# Patient Record
Sex: FEMALE | Race: WHITE | NOT HISPANIC OR LATINO | Employment: FULL TIME | URBAN - METROPOLITAN AREA
[De-identification: names, ages, dates, MRNs, and addresses within clinical notes are randomized per-mention and may not be internally consistent; named-entity substitution may affect disease eponyms.]

---

## 2017-07-11 ENCOUNTER — HOSPITAL ENCOUNTER (OUTPATIENT)
Dept: OTHER | Facility: HOSPITAL | Age: 32
Discharge: HOME | End: 2017-07-11
Attending: PHYSICIAN ASSISTANT | Admitting: PHYSICIAN ASSISTANT

## 2017-07-11 LAB
ALBUMIN SERPL-MCNC: 3.8 G/DL (ref 3.5–4.8)
ALBUMIN/GLOB SERPL: 0.9 {RATIO} (ref 1–1.7)
ALP SERPL-CCNC: 58 IU/L (ref 32–91)
ALT SERPL-CCNC: 35 IU/L (ref 14–54)
AMPHETAMINES UR QL SCN: NEGATIVE
ANION GAP SERPL CALC-SCNC: 14 MMOL/L (ref 10–20)
AST SERPL-CCNC: 28 IU/L (ref 15–41)
BARBITURATES UR QL SCN: NEGATIVE
BASOPHILS # BLD AUTO: 0.1 10*3/UL (ref 0–0.2)
BASOPHILS NFR BLD AUTO: 1 % (ref 0–2)
BENZODIAZ UR QL SCN: NEGATIVE
BILIRUB SERPL-MCNC: 0.6 MG/DL (ref 0.3–1.2)
BUN SERPL-MCNC: 10 MG/DL (ref 8–20)
BUN/CREAT SERPL: 12.5 (ref 5.4–26.2)
BZE UR QL SCN: NEGATIVE
CALCIUM SERPL-MCNC: 9.7 MG/DL (ref 8.9–10.3)
CHLORIDE SERPL-SCNC: 103 MMOL/L (ref 101–111)
CHOLEST SERPL-MCNC: 239 MG/DL
CHOLEST/HDLC SERPL: 5.2 {RATIO}
CONV CO2: 27 MMOL/L (ref 22–32)
CONV LDL CHOLESTEROL DIRECT: 177 MG/DL (ref 0–100)
CONV TOTAL PROTEIN: 8.2 G/DL (ref 6.1–7.9)
CREAT 24H UR-MCNC: NORMAL MG/DL
CREAT UR-MCNC: 0.8 MG/DL (ref 0.4–1)
DIFFERENTIAL METHOD BLD: (no result)
EOSINOPHIL # BLD AUTO: 0.1 10*3/UL (ref 0–0.3)
EOSINOPHIL # BLD AUTO: 1 % (ref 0–3)
ERYTHROCYTE [DISTWIDTH] IN BLOOD BY AUTOMATED COUNT: 13.3 % (ref 11.5–14.5)
GLOBULIN UR ELPH-MCNC: 4.4 G/DL (ref 2.5–3.8)
GLUCOSE SERPL-MCNC: 89 MG/DL (ref 65–99)
HCT VFR BLD AUTO: 40.1 % (ref 35–49)
HDLC SERPL-MCNC: 46 MG/DL
HGB BLD-MCNC: 13.6 G/DL (ref 12–15)
LDLC/HDLC SERPL: 3.8 {RATIO}
LIPID INTERPRETATION: ABNORMAL
LYMPHOCYTES # BLD AUTO: 1.4 10*3/UL (ref 0.8–4.8)
LYMPHOCYTES NFR BLD AUTO: 15 % (ref 18–42)
MCH RBC QN AUTO: 29.8 PG (ref 26–32)
MCHC RBC AUTO-ENTMCNC: 33.8 G/DL (ref 32–36)
MCV RBC AUTO: 88 FL (ref 80–94)
METHADONE UR QL SCN: NEGATIVE
MONOCYTES # BLD AUTO: 0.7 10*3/UL (ref 0.1–1.3)
MONOCYTES NFR BLD AUTO: 7 % (ref 2–11)
NEUTROPHILS # BLD AUTO: 7.1 10*3/UL (ref 2.3–8.6)
NEUTROPHILS NFR BLD AUTO: 76 % (ref 50–75)
NRBC BLD AUTO-RTO: 0 /100{WBCS}
NRBC/RBC NFR BLD MANUAL: 0 10*3/UL
OPIATES TESTED UR SCN: NEGATIVE
PCP UR QL: NEGATIVE
PLATELET # BLD AUTO: 355 10*3/UL (ref 150–450)
PMV BLD AUTO: 9.4 FL (ref 7.4–10.4)
POTASSIUM SERPL-SCNC: 4 MMOL/L (ref 3.6–5.1)
RBC # BLD AUTO: 4.56 10*6/UL (ref 4–5.4)
SODIUM SERPL-SCNC: 140 MMOL/L (ref 136–144)
THC SERPLBLD CFM-MCNC: NEGATIVE NG/ML
TRIGL SERPL-MCNC: 145 MG/DL
TSH SERPL-ACNC: 1.61 UIU/ML (ref 0.34–5.6)
VLDLC SERPL CALC-MCNC: 16.5 MG/DL
WBC # BLD AUTO: 9.3 10*3/UL (ref 4.5–11.5)

## 2017-09-19 ENCOUNTER — HOSPITAL ENCOUNTER (OUTPATIENT)
Dept: PREOP | Facility: HOSPITAL | Age: 32
Setting detail: HOSPITAL OUTPATIENT SURGERY
Discharge: HOME | End: 2017-09-19
Attending: SURGERY | Admitting: SURGERY

## 2017-09-19 LAB
BACTERIA UREASE ISLT QL: NORMAL
Lab: NORMAL
MICRO REPORT STATUS: NORMAL
SPECIMEN SOURCE: NORMAL

## 2017-12-28 ENCOUNTER — CONVERSION ENCOUNTER (OUTPATIENT)
Dept: OTHER | Facility: HOSPITAL | Age: 32
End: 2017-12-28

## 2018-02-05 ENCOUNTER — HOSPITAL ENCOUNTER (OUTPATIENT)
Dept: OTHER | Facility: HOSPITAL | Age: 33
Discharge: HOME | End: 2018-02-05
Attending: SURGERY | Admitting: SURGERY

## 2018-02-05 LAB
ALBUMIN SERPL-MCNC: 3.8 G/DL (ref 3.5–4.8)
ALBUMIN/GLOB SERPL: 1.1 {RATIO} (ref 1–1.7)
ALP SERPL-CCNC: 58 IU/L (ref 32–91)
ALT SERPL-CCNC: 56 IU/L (ref 14–54)
ANION GAP SERPL CALC-SCNC: 12.8 MMOL/L (ref 10–20)
AST SERPL-CCNC: 40 IU/L (ref 15–41)
BASOPHILS # BLD AUTO: 0 10*3/UL (ref 0–0.2)
BASOPHILS NFR BLD AUTO: 1 % (ref 0–2)
BILIRUB SERPL-MCNC: 0.6 MG/DL (ref 0.3–1.2)
BUN SERPL-MCNC: 12 MG/DL (ref 8–20)
BUN/CREAT SERPL: 15 (ref 5.4–26.2)
CALCIUM SERPL-MCNC: 9.7 MG/DL (ref 8.9–10.3)
CHLORIDE SERPL-SCNC: 104 MMOL/L (ref 101–111)
CONV CO2: 24 MMOL/L (ref 22–32)
CONV TOTAL PROTEIN: 7.3 G/DL (ref 6.1–7.9)
CREAT UR-MCNC: 0.8 MG/DL (ref 0.4–1)
DIFFERENTIAL METHOD BLD: (no result)
EOSINOPHIL # BLD AUTO: 0.1 10*3/UL (ref 0–0.3)
EOSINOPHIL # BLD AUTO: 1 % (ref 0–3)
ERYTHROCYTE [DISTWIDTH] IN BLOOD BY AUTOMATED COUNT: 14.3 % (ref 11.5–14.5)
GLOBULIN UR ELPH-MCNC: 3.5 G/DL (ref 2.5–3.8)
GLUCOSE SERPL-MCNC: 83 MG/DL (ref 65–99)
HCT VFR BLD AUTO: 37.1 % (ref 35–49)
HGB BLD-MCNC: 12.6 G/DL (ref 12–15)
IRON SERPL-MCNC: 37 UG/DL (ref 28–170)
LYMPHOCYTES # BLD AUTO: 1.4 10*3/UL (ref 0.8–4.8)
LYMPHOCYTES NFR BLD AUTO: 16 % (ref 18–42)
MAGNESIUM SERPL-MCNC: 1.7 MG/DL (ref 1.8–2.5)
MCH RBC QN AUTO: 30 PG (ref 26–32)
MCHC RBC AUTO-ENTMCNC: 33.9 G/DL (ref 32–36)
MCV RBC AUTO: 88.4 FL (ref 80–94)
MONOCYTES # BLD AUTO: 0.6 10*3/UL (ref 0.1–1.3)
MONOCYTES NFR BLD AUTO: 7 % (ref 2–11)
NEUTROPHILS # BLD AUTO: 6.9 10*3/UL (ref 2.3–8.6)
NEUTROPHILS NFR BLD AUTO: 75 % (ref 50–75)
NRBC BLD AUTO-RTO: 0 /100{WBCS}
NRBC/RBC NFR BLD MANUAL: 0 10*3/UL
PLATELET # BLD AUTO: 290 10*3/UL (ref 150–450)
PMV BLD AUTO: 10 FL (ref 7.4–10.4)
POTASSIUM SERPL-SCNC: 3.8 MMOL/L (ref 3.6–5.1)
PREALB SERPL-MCNC: NORMAL MG/DL (ref 16–38)
RBC # BLD AUTO: 4.19 10*6/UL (ref 4–5.4)
SODIUM SERPL-SCNC: 137 MMOL/L (ref 136–144)
WBC # BLD AUTO: 9 10*3/UL (ref 4.5–11.5)

## 2018-04-09 ENCOUNTER — HOSPITAL ENCOUNTER (OUTPATIENT)
Dept: OTHER | Facility: HOSPITAL | Age: 33
Discharge: HOME | End: 2018-04-09
Attending: SURGERY | Admitting: SURGERY

## 2018-04-09 LAB
ALBUMIN SERPL-MCNC: 3.9 G/DL (ref 3.5–4.8)
ALBUMIN/GLOB SERPL: 1.1 {RATIO} (ref 1–1.7)
ALP SERPL-CCNC: 67 IU/L (ref 32–91)
ALT SERPL-CCNC: 30 IU/L (ref 14–54)
ANION GAP SERPL CALC-SCNC: 10 MMOL/L (ref 10–20)
AST SERPL-CCNC: 28 IU/L (ref 15–41)
BASOPHILS # BLD AUTO: 0.1 10*3/UL (ref 0–0.2)
BASOPHILS NFR BLD AUTO: 1 % (ref 0–2)
BILIRUB SERPL-MCNC: 0.5 MG/DL (ref 0.3–1.2)
BUN SERPL-MCNC: 8 MG/DL (ref 8–20)
BUN/CREAT SERPL: 10 (ref 5.4–26.2)
CALCIUM SERPL-MCNC: 10.1 MG/DL (ref 8.9–10.3)
CHLORIDE SERPL-SCNC: 106 MMOL/L (ref 101–111)
CONV CO2: 27 MMOL/L (ref 22–32)
CONV TOTAL PROTEIN: 7.3 G/DL (ref 6.1–7.9)
CREAT UR-MCNC: 0.8 MG/DL (ref 0.4–1)
DIFFERENTIAL METHOD BLD: (no result)
EOSINOPHIL # BLD AUTO: 0 10*3/UL (ref 0–0.3)
EOSINOPHIL # BLD AUTO: 1 % (ref 0–3)
ERYTHROCYTE [DISTWIDTH] IN BLOOD BY AUTOMATED COUNT: 13.1 % (ref 11.5–14.5)
GLOBULIN UR ELPH-MCNC: 3.4 G/DL (ref 2.5–3.8)
GLUCOSE SERPL-MCNC: 83 MG/DL (ref 65–99)
HCT VFR BLD AUTO: 39.7 % (ref 35–49)
HGB BLD-MCNC: 13.6 G/DL (ref 12–15)
LYMPHOCYTES # BLD AUTO: 1.2 10*3/UL (ref 0.8–4.8)
LYMPHOCYTES NFR BLD AUTO: 17 % (ref 18–42)
MCH RBC QN AUTO: 30.1 PG (ref 26–32)
MCHC RBC AUTO-ENTMCNC: 34.2 G/DL (ref 32–36)
MCV RBC AUTO: 88.2 FL (ref 80–94)
MONOCYTES # BLD AUTO: 0.4 10*3/UL (ref 0.1–1.3)
MONOCYTES NFR BLD AUTO: 6 % (ref 2–11)
NEUTROPHILS # BLD AUTO: 5.4 10*3/UL (ref 2.3–8.6)
NEUTROPHILS NFR BLD AUTO: 75 % (ref 50–75)
NRBC BLD AUTO-RTO: 0 /100{WBCS}
NRBC/RBC NFR BLD MANUAL: 0 10*3/UL
PLATELET # BLD AUTO: 320 10*3/UL (ref 150–450)
PMV BLD AUTO: 9.6 FL (ref 7.4–10.4)
POTASSIUM SERPL-SCNC: 4 MMOL/L (ref 3.6–5.1)
RBC # BLD AUTO: 4.5 10*6/UL (ref 4–5.4)
SODIUM SERPL-SCNC: 139 MMOL/L (ref 136–144)
WBC # BLD AUTO: 7.1 10*3/UL (ref 4.5–11.5)

## 2019-06-03 VITALS — BODY MASS INDEX: 43.39 KG/M2 | WEIGHT: 237.25 LBS

## 2020-11-10 ENCOUNTER — OFFICE VISIT (OUTPATIENT)
Dept: SLEEP MEDICINE | Facility: CLINIC | Age: 35
End: 2020-11-10

## 2020-11-10 VITALS
HEART RATE: 62 BPM | DIASTOLIC BLOOD PRESSURE: 58 MMHG | OXYGEN SATURATION: 100 % | BODY MASS INDEX: 29.08 KG/M2 | SYSTOLIC BLOOD PRESSURE: 93 MMHG | WEIGHT: 158 LBS | HEIGHT: 62 IN

## 2020-11-10 DIAGNOSIS — R06.83 SNORING: ICD-10-CM

## 2020-11-10 DIAGNOSIS — G47.30 OBSERVED SLEEP APNEA: Primary | ICD-10-CM

## 2020-11-10 DIAGNOSIS — G47.10 HYPERSOMNIA: ICD-10-CM

## 2020-11-10 PROCEDURE — G0463 HOSPITAL OUTPT CLINIC VISIT: HCPCS

## 2020-11-10 PROCEDURE — 99244 OFF/OP CNSLTJ NEW/EST MOD 40: CPT | Performed by: INTERNAL MEDICINE

## 2020-11-10 RX ORDER — IBUPROFEN 400 MG/1
200 TABLET ORAL EVERY 6 HOURS PRN
COMMUNITY

## 2020-11-10 RX ORDER — ACETAMINOPHEN,DIPHENHYDRAMINE HCL 500; 25 MG/1; MG/1
1 TABLET, FILM COATED ORAL NIGHTLY PRN
COMMUNITY

## 2020-11-10 NOTE — PROGRESS NOTES
[facility]  [address] [place] [address]  Phone   Fax       Nahed E Carlos  1985  35 y.o.  female      Referring Provider and PCP Elvis Carolina MD    Type of service: Initial consult  Date of service: 11/10/2020      Chief Complaint   Patient presents with   • Sleeping Problem   • Fatigue   • Daytime Sleepiness   • Snoring       History of present illness;  Thank you for asking to see Nahed E Carlos, 35 y.o.  for evaluation of sleep apnea. The patient was seen today on 11/10/2020 at [facility].   She has been sent for evaluation of sleep apnea because of fatigue and daytime excessive sleepiness.  She works at [employer] and is having the symptoms for number of months.  In 2018 she underwent gastric sleeve surgery and has lost significant amount of weight.  Her body mass index was 43 before the surgery and now it is 28.    Patient gives the following sleep history.  Sleep schedule:  Bedtime: Between 9 and 10 PM  Wake time: 6:30 AM  Normally takes about 5-10 minutes to fall asleep  Average hours of sleep 7-8  Number of naps per day yes 1  When patient wakes up still feels tired and not rested  Snoring; yes  Witnessed apneas; not sure  Have you ever awakened gasping for breath, coughing, choking: Sometimes      Past Medical History:   Diagnosis Date   • Asthma    • Excessive daytime sleepiness    • Mental disorder    • Obesity        Past Surgical history   has a past surgical history that includes Gastric Sleeve (2017).     Social history:  Shift work: No  Tobacco use: A pack a day  Alcohol use: Social  Caffeinated drinks: 1 to 2 cups of coffee  Over-the-counter sleeping aid and medications: Tylenol PM  Narcotic medications: No    Family Hx  Family history of sleep apnea, negative for sleep apnea  Family History   Problem Relation Age of Onset   • Lupus Mother    • Thrombophilia Mother    • Arthritis Mother        Medications:  "reviewed    Current Outpatient Medications:   •  diphenhydrAMINE-acetaminophen (TYLENOL PM)  MG tablet per tablet, Take 1 tablet by mouth At Night As Needed for Sleep., Disp: , Rfl:   •  ibuprofen (ADVIL,MOTRIN) 400 MG tablet, Take 200 mg by mouth Every 6 (Six) Hours As Needed for Mild Pain ., Disp: , Rfl:   •  loperamide (IMODIUM) 2 MG capsule, Take 1 capsule by mouth 4 (Four) Times a Day As Needed for Diarrhea., Disp: 15 capsule, Rfl: 0  •  ondansetron ODT (ZOFRAN-ODT) 4 MG disintegrating tablet, 1 to 2 tablets p.o. every 4 to 6 hours as needed nausea and vomiting, Disp: 30 tablet, Rfl: 0    Review of systems:  [place] Sleepiness Scale: Total score: 13   Positive for snoring, witnessed apneas, fatigue and daytime excessive sleepiness,   Negative for shortness of breath, cough, wheezing, chest pain, nausea and vomiting, palpitation, swelling of feet:    Morning headaches: No  Awaken with sore throat or dry mouth : Yes  Leg jerking at night: No  Crawly feeling/urge sensation to move in the legs: No  Teeth grinding: No  Sleepwalking, nightmares, muscle weakness with laughing or anger,sleep paralysis: No  Nasal Congestion: No  Nocturia (how many times/night): 1  Memory Problem: No  Change in weight, significant amount of weight loss after gastric sleeve surgery    Physical exam:  Vitals:    11/10/20 0945   BP: 93/58   BP Location: Right arm   Patient Position: Sitting   Cuff Size: Adult   Pulse: 62   SpO2: 100%   Weight: 71.7 kg (158 lb)   Height: 157.5 cm (62\")    Body mass index is 28.9 kg/m². Neck Circumference: 13 inches  HEENT: Head is atraumatic, normocephalic   Eyes:pupils are round equal and reacting to light and accommodation, conjunctiva normal  Nose:no nasal septal defects or deviation and the nasal passages are clear, no nasal polyps,   Throat: tonsils are not enlarged, tongue normal size, oral airway Mallampati class 2  NECK: No lymphadenopathy, trachea is in the midline, thyroid not " enlarged  RESPIRATORY SYSTEM: Breath sounds are equal on both sides, there are no wheezes or crackles  CARDIOVASULAR SYSTEM: Heart sounds are regular rhythm and tommy rate, there are no murmurs or thrills  ABDOMEN: Soft, no hepatosplenomegaly, no evidence of ascites  EXTREMITES: No cyanosis, clubbing or edema   NEUROLOGICAL SYSTEM: Oriented x 3, no gross neurological defects, gait normal    Medical records and labs reviewed, thyroid function normal, sent from PCP    Assessment and plan:  · Sleep apnea unspecified, (G47.30) Patient's symptoms and examination is consistent with sleep apnea.  I have talked to the patient about the signs and symptoms of sleep apnea and consequences of untreated sleep apnea. Discussed sleep testing.  I have placed a order in epic for a home sleep test.  Patient will have a follow-up after this sleep test is done.   · Snoring secondary to sleep apnea  · Hypersomnia with [place] Sleepiness Scale of Total score: 13 due to sleep apnea.      I once again thank you for asking me to see this patient in consultation and I have forwarded my opinion and treatment plan.  Please do not hesitate to call me if you have any questions.     Jazzy Cueto MD  Sleep Medicine.(Board-certified)  [facility]  Medical Director for [facility]  11/10/2020 ,

## 2020-11-16 ENCOUNTER — HOSPITAL ENCOUNTER (OUTPATIENT)
Dept: SLEEP MEDICINE | Facility: HOSPITAL | Age: 35
Discharge: HOME | End: 2020-11-16
Admitting: INTERNAL MEDICINE

## 2020-11-16 DIAGNOSIS — G47.10 HYPERSOMNIA: ICD-10-CM

## 2020-11-16 DIAGNOSIS — G47.30 OBSERVED SLEEP APNEA: ICD-10-CM

## 2020-11-16 DIAGNOSIS — R06.83 SNORING: ICD-10-CM

## 2020-11-16 PROCEDURE — 95806 SLEEP STUDY UNATT&RESP EFFT: CPT

## 2020-11-16 PROCEDURE — 95806 SLEEP STUDY UNATT&RESP EFFT: CPT | Performed by: INTERNAL MEDICINE

## 2021-01-19 ENCOUNTER — HOSPITAL ENCOUNTER (OUTPATIENT)
Dept: GENERAL RADIOLOGY | Facility: HOSPITAL | Age: 36
Discharge: HOME | End: 2021-01-19
Admitting: FAMILY MEDICINE

## 2021-01-19 ENCOUNTER — TRANSCRIBE ORDERS (OUTPATIENT)
Dept: ADMINISTRATIVE | Facility: HOSPITAL | Age: 36
End: 2021-01-19

## 2021-01-19 DIAGNOSIS — M25.552 HIP PAIN, BILATERAL: Primary | ICD-10-CM

## 2021-01-19 DIAGNOSIS — M25.551 HIP PAIN, BILATERAL: ICD-10-CM

## 2021-01-19 DIAGNOSIS — M25.551 HIP PAIN, BILATERAL: Primary | ICD-10-CM

## 2021-01-19 DIAGNOSIS — M25.552 HIP PAIN, BILATERAL: ICD-10-CM

## 2021-01-19 PROCEDURE — 73521 X-RAY EXAM HIPS BI 2 VIEWS: CPT

## 2021-05-17 ENCOUNTER — TELEPHONE (OUTPATIENT)
Dept: BARIATRICS/WEIGHT MGMT | Facility: CLINIC | Age: 36
End: 2021-05-17

## 2021-05-17 NOTE — TELEPHONE ENCOUNTER
Called about missed appt this morning at 930. Went straight to [facility] asking Nahed to call the office back to reschedule her appt, and if we don't hear back by the end of the day she would be marked as a no show appt.